# Patient Record
Sex: MALE | Race: WHITE
[De-identification: names, ages, dates, MRNs, and addresses within clinical notes are randomized per-mention and may not be internally consistent; named-entity substitution may affect disease eponyms.]

---

## 2021-01-11 ENCOUNTER — HOSPITAL ENCOUNTER (OUTPATIENT)
Dept: HOSPITAL 79 - OPSV | Age: 86
End: 2021-01-11
Attending: INTERNAL MEDICINE
Payer: MEDICARE

## 2021-01-11 VITALS — HEIGHT: 72 IN | WEIGHT: 279 LBS | BODY MASS INDEX: 37.79 KG/M2

## 2021-01-11 DIAGNOSIS — D50.9: ICD-10-CM

## 2021-01-11 DIAGNOSIS — K90.9: ICD-10-CM

## 2021-01-11 DIAGNOSIS — D62: Primary | ICD-10-CM

## 2021-02-08 ENCOUNTER — HOSPITAL ENCOUNTER (OUTPATIENT)
Dept: HOSPITAL 79 - OPSV | Age: 86
End: 2021-02-08
Attending: INTERNAL MEDICINE
Payer: MEDICARE

## 2021-02-08 VITALS — BODY MASS INDEX: 37.79 KG/M2 | WEIGHT: 279 LBS | HEIGHT: 72 IN

## 2021-02-08 DIAGNOSIS — K90.9: ICD-10-CM

## 2021-02-08 DIAGNOSIS — D62: ICD-10-CM

## 2021-02-08 DIAGNOSIS — D50.9: Primary | ICD-10-CM

## 2021-03-08 ENCOUNTER — HOSPITAL ENCOUNTER (OUTPATIENT)
Dept: HOSPITAL 79 - OPSV | Age: 86
End: 2021-03-08
Attending: INTERNAL MEDICINE
Payer: MEDICARE

## 2021-03-08 VITALS — BODY MASS INDEX: 37.79 KG/M2 | WEIGHT: 279 LBS | HEIGHT: 72 IN

## 2021-03-08 DIAGNOSIS — K90.9: ICD-10-CM

## 2021-03-08 DIAGNOSIS — D50.9: ICD-10-CM

## 2021-03-08 DIAGNOSIS — D62: Primary | ICD-10-CM

## 2021-04-06 ENCOUNTER — HOSPITAL ENCOUNTER (OUTPATIENT)
Dept: HOSPITAL 79 - OPSV | Age: 86
End: 2021-04-06
Attending: INTERNAL MEDICINE
Payer: MEDICARE

## 2021-04-06 VITALS — BODY MASS INDEX: 37.79 KG/M2 | WEIGHT: 279 LBS | HEIGHT: 72 IN

## 2021-04-06 DIAGNOSIS — Z80.9: ICD-10-CM

## 2021-04-06 DIAGNOSIS — K90.9: ICD-10-CM

## 2021-04-06 DIAGNOSIS — D50.9: Primary | ICD-10-CM

## 2021-05-24 ENCOUNTER — HOSPITAL ENCOUNTER (OUTPATIENT)
Dept: HOSPITAL 79 - KOH-I | Age: 86
End: 2021-05-24
Attending: PHYSICIAN ASSISTANT
Payer: MEDICARE

## 2021-05-24 DIAGNOSIS — R05: Primary | ICD-10-CM

## 2021-05-24 DIAGNOSIS — R91.8: ICD-10-CM

## 2021-08-04 ENCOUNTER — HOSPITAL ENCOUNTER (OUTPATIENT)
Dept: HOSPITAL 79 - OPSV | Age: 86
End: 2021-08-04
Attending: INTERNAL MEDICINE
Payer: MEDICARE

## 2021-08-04 VITALS — BODY MASS INDEX: 37.79 KG/M2 | WEIGHT: 279 LBS | HEIGHT: 72 IN

## 2021-08-04 DIAGNOSIS — D62: Primary | ICD-10-CM

## 2021-08-04 LAB — HGB BLD-MCNC: 8 GM/DL (ref 14–17.5)

## 2021-08-04 PROCEDURE — P9016 RBC LEUKOCYTES REDUCED: HCPCS

## 2021-08-05 ENCOUNTER — HOSPITAL ENCOUNTER (OUTPATIENT)
Dept: HOSPITAL 79 - OPSV | Age: 86
End: 2021-08-05
Attending: INTERNAL MEDICINE
Payer: MEDICARE

## 2021-08-05 DIAGNOSIS — D62: Primary | ICD-10-CM

## 2021-08-05 PROCEDURE — P9016 RBC LEUKOCYTES REDUCED: HCPCS

## 2021-08-10 ENCOUNTER — HOSPITAL ENCOUNTER (EMERGENCY)
Dept: HOSPITAL 79 - ER1 | Age: 86
Discharge: HOME | End: 2021-08-10
Payer: MEDICARE

## 2021-08-10 DIAGNOSIS — R00.1: Primary | ICD-10-CM

## 2021-08-10 DIAGNOSIS — Z20.822: ICD-10-CM

## 2021-08-10 DIAGNOSIS — I10: ICD-10-CM

## 2021-08-10 DIAGNOSIS — Z90.89: ICD-10-CM

## 2021-08-10 DIAGNOSIS — E78.5: ICD-10-CM

## 2021-08-10 LAB
BUN/CREATININE RATIO: 16 (ref 0–10)
HGB BLD-MCNC: 10.3 GM/DL (ref 14–17.5)
RED BLOOD COUNT: 3.54 M/UL (ref 4.2–5.5)
WHITE BLOOD COUNT: 4.6 K/UL (ref 4.5–11)

## 2021-08-10 PROCEDURE — U0002 COVID-19 LAB TEST NON-CDC: HCPCS

## 2021-10-25 ENCOUNTER — HOSPITAL ENCOUNTER (OUTPATIENT)
Dept: HOSPITAL 79 - HEART 5 | Age: 86
End: 2021-10-25
Attending: INTERNAL MEDICINE
Payer: MEDICARE

## 2021-10-25 DIAGNOSIS — R06.02: ICD-10-CM

## 2021-10-25 DIAGNOSIS — R00.1: ICD-10-CM

## 2021-10-25 DIAGNOSIS — I48.91: Primary | ICD-10-CM

## 2021-10-25 DIAGNOSIS — I08.3: ICD-10-CM

## 2021-10-25 DIAGNOSIS — I27.20: ICD-10-CM

## 2021-11-02 ENCOUNTER — HOSPITAL ENCOUNTER (OUTPATIENT)
Dept: HOSPITAL 79 - OPSV | Age: 86
End: 2021-11-02
Attending: INTERNAL MEDICINE
Payer: MEDICARE

## 2021-11-02 DIAGNOSIS — D62: Primary | ICD-10-CM

## 2021-11-02 LAB
HGB BLD-MCNC: 5.7 GM/DL (ref 14–17.5)
HGB BLD-MCNC: 7.6 GM/DL (ref 14–17.5)
RED BLOOD COUNT: 2.66 M/UL (ref 4.2–5.5)
WHITE BLOOD COUNT: 4.5 K/UL (ref 4.5–11)

## 2021-11-02 PROCEDURE — P9016 RBC LEUKOCYTES REDUCED: HCPCS

## 2021-11-05 ENCOUNTER — HOSPITAL ENCOUNTER (OUTPATIENT)
Dept: HOSPITAL 79 - LAB | Age: 86
End: 2021-11-05
Attending: INTERNAL MEDICINE
Payer: MEDICARE

## 2021-11-05 DIAGNOSIS — D62: Primary | ICD-10-CM

## 2021-11-05 LAB
HGB BLD-MCNC: 7.3 GM/DL (ref 14–17.5)
RED BLOOD COUNT: 2.54 M/UL (ref 4.2–5.5)
WHITE BLOOD COUNT: 5 K/UL (ref 4.5–11)

## 2021-11-08 ENCOUNTER — HOSPITAL ENCOUNTER (OUTPATIENT)
Dept: HOSPITAL 79 - OPSV | Age: 86
Discharge: HOME | End: 2021-11-08
Attending: INTERNAL MEDICINE
Payer: MEDICARE

## 2021-11-08 VITALS — BODY MASS INDEX: 35 KG/M2 | HEIGHT: 71 IN | WEIGHT: 250 LBS

## 2021-11-08 DIAGNOSIS — D62: Primary | ICD-10-CM

## 2021-11-08 LAB
BUN/CREATININE RATIO: 17 (ref 0–10)
HGB BLD-MCNC: 7.3 GM/DL (ref 14–17.5)
HGB BLD-MCNC: 9.6 GM/DL (ref 14–17.5)
RED BLOOD COUNT: 3.4 M/UL (ref 4.2–5.5)
WHITE BLOOD COUNT: 5.2 K/UL (ref 4.5–11)

## 2021-11-08 PROCEDURE — P9016 RBC LEUKOCYTES REDUCED: HCPCS

## 2021-11-23 ENCOUNTER — HOSPITAL ENCOUNTER (OUTPATIENT)
Dept: HOSPITAL 79 - OPSV | Age: 86
Discharge: HOME | End: 2021-11-23
Attending: INTERNAL MEDICINE
Payer: MEDICARE

## 2021-11-23 DIAGNOSIS — D62: Primary | ICD-10-CM

## 2021-11-23 LAB
HGB BLD-MCNC: 8.4 GM/DL (ref 14–17.5)
RED BLOOD COUNT: 3.05 M/UL (ref 4.2–5.5)
WHITE BLOOD COUNT: 4.6 K/UL (ref 4.5–11)

## 2021-11-23 PROCEDURE — P9016 RBC LEUKOCYTES REDUCED: HCPCS

## 2021-11-23 NOTE — NUR
ANA VALENCIA
IN THE INUSION CLINIC SAID DR. DEVIRES SAID IT
WAS OK TO START THE BLOOD TRANSFUSION AND WOULD PUT THE ORDER IN FOR THE BLOOD
PRESSURE MEDICATION.

## 2022-01-10 ENCOUNTER — HOSPITAL ENCOUNTER (INPATIENT)
Dept: HOSPITAL 79 - ER1 | Age: 87
LOS: 9 days | Discharge: HOME | DRG: 377 | End: 2022-01-19
Attending: INTERNAL MEDICINE | Admitting: INTERNAL MEDICINE
Payer: MEDICARE

## 2022-01-10 VITALS — BODY MASS INDEX: 36.19 KG/M2 | HEIGHT: 70.98 IN | WEIGHT: 258.5 LBS

## 2022-01-10 DIAGNOSIS — K76.0: ICD-10-CM

## 2022-01-10 DIAGNOSIS — I50.43: ICD-10-CM

## 2022-01-10 DIAGNOSIS — D68.61: ICD-10-CM

## 2022-01-10 DIAGNOSIS — Z20.822: ICD-10-CM

## 2022-01-10 DIAGNOSIS — I85.00: ICD-10-CM

## 2022-01-10 DIAGNOSIS — K55.21: Primary | ICD-10-CM

## 2022-01-10 DIAGNOSIS — E78.5: ICD-10-CM

## 2022-01-10 DIAGNOSIS — Z79.01: ICD-10-CM

## 2022-01-10 DIAGNOSIS — Z90.49: ICD-10-CM

## 2022-01-10 DIAGNOSIS — I25.10: ICD-10-CM

## 2022-01-10 DIAGNOSIS — I13.0: ICD-10-CM

## 2022-01-10 DIAGNOSIS — N17.9: ICD-10-CM

## 2022-01-10 DIAGNOSIS — Z86.73: ICD-10-CM

## 2022-01-10 DIAGNOSIS — M81.0: ICD-10-CM

## 2022-01-10 DIAGNOSIS — K55.20: ICD-10-CM

## 2022-01-10 DIAGNOSIS — N18.32: ICD-10-CM

## 2022-01-10 DIAGNOSIS — R00.1: ICD-10-CM

## 2022-01-10 DIAGNOSIS — M06.9: ICD-10-CM

## 2022-01-10 DIAGNOSIS — E78.00: ICD-10-CM

## 2022-01-10 DIAGNOSIS — K74.60: ICD-10-CM

## 2022-01-10 DIAGNOSIS — R53.81: ICD-10-CM

## 2022-01-10 DIAGNOSIS — I27.20: ICD-10-CM

## 2022-01-10 DIAGNOSIS — I08.1: ICD-10-CM

## 2022-01-10 DIAGNOSIS — R68.0: ICD-10-CM

## 2022-01-10 DIAGNOSIS — J96.21: ICD-10-CM

## 2022-01-10 DIAGNOSIS — K76.6: ICD-10-CM

## 2022-01-10 DIAGNOSIS — Z82.3: ICD-10-CM

## 2022-01-10 DIAGNOSIS — J84.10: ICD-10-CM

## 2022-01-10 DIAGNOSIS — E66.01: ICD-10-CM

## 2022-01-10 DIAGNOSIS — D62: ICD-10-CM

## 2022-01-10 DIAGNOSIS — I48.20: ICD-10-CM

## 2022-01-10 DIAGNOSIS — Z86.711: ICD-10-CM

## 2022-01-10 DIAGNOSIS — Z82.49: ICD-10-CM

## 2022-01-10 LAB
BUN/CREATININE RATIO: 18 (ref 0–10)
HGB BLD-MCNC: 5.1 GM/DL (ref 14–17.5)
RED BLOOD COUNT: 1.84 M/UL (ref 4.2–5.5)
WHITE BLOOD COUNT: 5.1 K/UL (ref 4.5–11)

## 2022-01-10 PROCEDURE — P9047 ALBUMIN (HUMAN), 25%, 50ML: HCPCS

## 2022-01-10 PROCEDURE — A6212 FOAM DRG <=16 SQ IN W/BORDER: HCPCS

## 2022-01-10 PROCEDURE — U0002 COVID-19 LAB TEST NON-CDC: HCPCS

## 2022-01-10 PROCEDURE — 30233N1 TRANSFUSION OF NONAUTOLOGOUS RED BLOOD CELLS INTO PERIPHERAL VEIN, PERCUTANEOUS APPROACH: ICD-10-PCS | Performed by: INTERNAL MEDICINE

## 2022-01-10 PROCEDURE — C9113 INJ PANTOPRAZOLE SODIUM, VIA: HCPCS

## 2022-01-10 PROCEDURE — P9016 RBC LEUKOCYTES REDUCED: HCPCS

## 2022-01-11 LAB
BUN/CREATININE RATIO: 18 (ref 0–10)
HGB BLD-MCNC: 6.7 GM/DL (ref 14–17.5)
HGB BLD-MCNC: 7.7 GM/DL (ref 14–17.5)
HGB BLD-MCNC: 9 GM/DL (ref 14–17.5)
RED BLOOD COUNT: 2.35 M/UL (ref 4.2–5.5)
WHITE BLOOD COUNT: 6.8 K/UL (ref 4.5–11)

## 2022-01-12 LAB
BUN/CREATININE RATIO: 17 (ref 0–10)
HGB BLD-MCNC: 8.7 GM/DL (ref 14–17.5)
RED BLOOD COUNT: 3 M/UL (ref 4.2–5.5)
WHITE BLOOD COUNT: 5.8 K/UL (ref 4.5–11)

## 2022-01-12 PROCEDURE — 0DJ08ZZ INSPECTION OF UPPER INTESTINAL TRACT, VIA NATURAL OR ARTIFICIAL OPENING ENDOSCOPIC: ICD-10-PCS | Performed by: INTERNAL MEDICINE

## 2022-01-13 LAB
BUN/CREATININE RATIO: 17 (ref 0–10)
BUN/CREATININE RATIO: 19 (ref 0–10)
HGB BLD-MCNC: 8.7 GM/DL (ref 14–17.5)
RED BLOOD COUNT: 3.1 M/UL (ref 4.2–5.5)
WHITE BLOOD COUNT: 5.6 K/UL (ref 4.5–11)

## 2022-01-13 PROCEDURE — B24BZZZ ULTRASONOGRAPHY OF HEART WITH AORTA: ICD-10-PCS | Performed by: STUDENT IN AN ORGANIZED HEALTH CARE EDUCATION/TRAINING PROGRAM

## 2022-01-14 LAB
BUN/CREATININE RATIO: 19 (ref 0–10)
HGB BLD-MCNC: 9.2 GM/DL (ref 14–17.5)
RED BLOOD COUNT: 3.2 M/UL (ref 4.2–5.5)
WHITE BLOOD COUNT: 5 K/UL (ref 4.5–11)

## 2022-01-15 LAB
BUN/CREATININE RATIO: 22 (ref 0–10)
CREATININE, URINE: 73.5 MG/DL
HGB BLD-MCNC: 8.2 GM/DL (ref 14–17.5)
MICROALB/CREAT RATIO: 382 (ref 0–29)
RED BLOOD COUNT: 2.83 M/UL (ref 4.2–5.5)
WHITE BLOOD COUNT: 4.3 K/UL (ref 4.5–11)

## 2022-01-16 LAB
BUN/CREATININE RATIO: 27 (ref 0–10)
COMPLEMENT C3, SERUM: 83 MG/DL (ref 82–167)
COMPLEMENT C4, SERUM: 15 MG/DL (ref 12–38)
HGB BLD-MCNC: 8.8 GM/DL (ref 14–17.5)
RED BLOOD COUNT: 3.1 M/UL (ref 4.2–5.5)
WHITE BLOOD COUNT: 4.8 K/UL (ref 4.5–11)

## 2022-01-17 LAB
A/G RATIO: 1.5 (ref 0.7–1.7)
ALBUMIN: 3 G/DL (ref 2.9–4.4)
ALPHA-1-GLOBULIN: 0.3 G/DL (ref 0–0.4)
ALPHA-2-GLOBULIN: 0.6 G/DL (ref 0.4–1)
ANTI-DSDNA ANTIBODIES: <1 IU/ML (ref 0–9)
BETA GLOBULIN: 0.6 G/DL (ref 0.7–1.3)
BUN/CREATININE RATIO: 28 (ref 0–10)
GAMMA GLOBULIN: 0.6 G/DL (ref 0.4–1.8)
GLOBULIN, TOTAL: 2.1 G/DL (ref 2.2–3.9)
HGB BLD-MCNC: 7.9 GM/DL (ref 14–17.5)
IMMUNOGLOBULIN G, QN, SERUM: 663 MG/DL (ref 603–1613)
IMMUNOGLOBULIN M, QN, SERUM: 37 MG/DL (ref 15–143)
PROTEIN, TOTAL, SERUM: 5.1 G/DL (ref 6–8.5)
RED BLOOD COUNT: 2.83 M/UL (ref 4.2–5.5)
WHITE BLOOD COUNT: 4.4 K/UL (ref 4.5–11)

## 2022-01-18 LAB
ANTIMYELOPEROXIDASE (MPO) ABS: <9 U/ML (ref 0–9)
ANTIPROTEINASE 3 (PR-3) ABS: <3.5 U/ML (ref 0–3.5)
ATYPICAL PANCA: (no result) TITER
BUN/CREATININE RATIO: 28 (ref 0–10)
CYTOPLASMIC (C-ANCA): (no result) TITER
HGB BLD-MCNC: 8.1 GM/DL (ref 14–17.5)
PERINUCLEAR (P-ANCA): (no result) TITER
RED BLOOD COUNT: 2.84 M/UL (ref 4.2–5.5)
WHITE BLOOD COUNT: 4.9 K/UL (ref 4.5–11)

## 2022-01-19 LAB
BUN/CREATININE RATIO: 27 (ref 0–10)
HGB BLD-MCNC: 8.3 GM/DL (ref 14–17.5)
RED BLOOD COUNT: 2.88 M/UL (ref 4.2–5.5)
WHITE BLOOD COUNT: 4.8 K/UL (ref 4.5–11)